# Patient Record
Sex: MALE | Race: BLACK OR AFRICAN AMERICAN
[De-identification: names, ages, dates, MRNs, and addresses within clinical notes are randomized per-mention and may not be internally consistent; named-entity substitution may affect disease eponyms.]

---

## 2018-07-16 ENCOUNTER — HOSPITAL ENCOUNTER (OUTPATIENT)
Dept: HOSPITAL 92 - TBSIIMAG | Age: 48
Discharge: HOME | End: 2018-07-16
Attending: ORTHOPAEDIC SURGERY
Payer: COMMERCIAL

## 2018-07-16 DIAGNOSIS — S63.412A: Primary | ICD-10-CM

## 2018-07-16 NOTE — MRI
MRI OF THE RIGHT LONG DIGIT:

 

DATE: 7/16/18.

 

PROVIDED CLINICAL HISTORY: 

Right long digit pain.

 

FINDINGS: 

Evaluation is limited by patient motion.

 

Regional marrow signal appears normal.  Alignment appears anatomic.  Joint spaces appear preserved.  


 

The extensor mechanism and flexor tendons appear intact.  The flexor pulleys appear intact.  The PIP 
and DIP joint capsules appear normal.  No evidence for collateral ligament injury.  The MCP joint virginie
ears normal.  

 

IMPRESSION: 

No evidence for internal derangement.

 

POS: C